# Patient Record
Sex: FEMALE | Race: WHITE | Employment: FULL TIME
[De-identification: names, ages, dates, MRNs, and addresses within clinical notes are randomized per-mention and may not be internally consistent; named-entity substitution may affect disease eponyms.]

---

## 2023-03-06 ENCOUNTER — OFFICE VISIT (OUTPATIENT)
Dept: PRIMARY CARE CLINIC | Facility: CLINIC | Age: 33
End: 2023-03-06
Payer: COMMERCIAL

## 2023-03-06 VITALS
RESPIRATION RATE: 16 BRPM | HEART RATE: 95 BPM | SYSTOLIC BLOOD PRESSURE: 116 MMHG | HEIGHT: 67 IN | WEIGHT: 132.4 LBS | TEMPERATURE: 97.8 F | BODY MASS INDEX: 20.78 KG/M2 | DIASTOLIC BLOOD PRESSURE: 67 MMHG | OXYGEN SATURATION: 98 %

## 2023-03-06 DIAGNOSIS — G93.9 RIGHT FRONTAL LOBE LESION: ICD-10-CM

## 2023-03-06 DIAGNOSIS — R20.2 NUMBNESS AND TINGLING OF RIGHT ARM AND LEG: Primary | ICD-10-CM

## 2023-03-06 DIAGNOSIS — R20.0 NUMBNESS AND TINGLING OF RIGHT ARM AND LEG: Primary | ICD-10-CM

## 2023-03-06 PROCEDURE — 99204 OFFICE O/P NEW MOD 45 MIN: CPT | Performed by: FAMILY MEDICINE

## 2023-03-06 RX ORDER — MULTIVIT-MIN/IRON FUM/FOLIC AC 7.5 MG-4
TABLET ORAL
Qty: 100 TABLET | Refills: 3 | Status: SHIPPED | OUTPATIENT
Start: 2023-03-06

## 2023-03-06 SDOH — ECONOMIC STABILITY: FOOD INSECURITY: WITHIN THE PAST 12 MONTHS, THE FOOD YOU BOUGHT JUST DIDN'T LAST AND YOU DIDN'T HAVE MONEY TO GET MORE.: NEVER TRUE

## 2023-03-06 SDOH — ECONOMIC STABILITY: FOOD INSECURITY: WITHIN THE PAST 12 MONTHS, YOU WORRIED THAT YOUR FOOD WOULD RUN OUT BEFORE YOU GOT MONEY TO BUY MORE.: NEVER TRUE

## 2023-03-06 SDOH — ECONOMIC STABILITY: HOUSING INSECURITY
IN THE LAST 12 MONTHS, WAS THERE A TIME WHEN YOU DID NOT HAVE A STEADY PLACE TO SLEEP OR SLEPT IN A SHELTER (INCLUDING NOW)?: NO

## 2023-03-06 SDOH — ECONOMIC STABILITY: INCOME INSECURITY: HOW HARD IS IT FOR YOU TO PAY FOR THE VERY BASICS LIKE FOOD, HOUSING, MEDICAL CARE, AND HEATING?: NOT HARD AT ALL

## 2023-03-06 ASSESSMENT — ENCOUNTER SYMPTOMS
CHEST TIGHTNESS: 0
NAUSEA: 0
SINUS PAIN: 0
PHOTOPHOBIA: 0
BACK PAIN: 0
ABDOMINAL DISTENTION: 0
APNEA: 0
EYE DISCHARGE: 0
CONSTIPATION: 0
SINUS PRESSURE: 0
SORE THROAT: 0
EYE REDNESS: 0
SHORTNESS OF BREATH: 0
ABDOMINAL PAIN: 0
BLOOD IN STOOL: 0
CHOKING: 0
EYE PAIN: 0
WHEEZING: 0
TROUBLE SWALLOWING: 0
VOICE CHANGE: 0
COUGH: 0
DIARRHEA: 0
VOMITING: 0
COLOR CHANGE: 0
RHINORRHEA: 0

## 2023-03-06 ASSESSMENT — PATIENT HEALTH QUESTIONNAIRE - PHQ9
SUM OF ALL RESPONSES TO PHQ QUESTIONS 1-9: 0
1. LITTLE INTEREST OR PLEASURE IN DOING THINGS: 0
SUM OF ALL RESPONSES TO PHQ9 QUESTIONS 1 & 2: 0
2. FEELING DOWN, DEPRESSED OR HOPELESS: 0

## 2023-03-06 NOTE — PROGRESS NOTES
Here for follow-up after hospital visit. Had numbness or tingling right side of body he still has some numbness right foot dorsum. Denies any back pain. She had a CT scan brain followed by MRI with and without contrast which has some nonspecific abnormalities cystic periventricular abnormalities not suggestive of actual cyst or tumor. Also lab test was normal pregnancy was negative. She is sexually active had not recent menstrual period. She denies smoking alcohol or drug abuse denies any recent weight changes. I have discussed at length the lab test and the CT scan MRI and went over the radiological report word by word and explained the possible significance however reassured that there is no obvious tumor that need to be removed and to cystic abnormalities on MRI does not appear to be significant by themselves. Due to his numbness she may benefit from neurology evaluation possible rule out multiple sclerosis or evaluation. She is otherwise general good health. Some of her symptoms possibly related to anxiety. Discussed management of anxiety hyperventilation possibility of starting SSRI if fluoxetine. She want to research on this before starting medications that seems appropriate. Review of Systems   Constitutional:  Negative for activity change, appetite change, chills, diaphoresis, fatigue, fever and unexpected weight change. HENT:  Negative for congestion, dental problem, drooling, ear pain, hearing loss, nosebleeds, rhinorrhea, sinus pressure, sinus pain, sore throat, trouble swallowing and voice change. Eyes:  Negative for photophobia, pain, discharge, redness and visual disturbance. Respiratory:  Negative for apnea, cough, choking, chest tightness, shortness of breath and wheezing. Cardiovascular:  Negative for chest pain, palpitations and leg swelling. Gastrointestinal:  Negative for abdominal distention, abdominal pain, blood in stool, constipation, diarrhea, nausea and vomiting.   Endocrine: Negative for polydipsia, polyphagia and polyuria.   Genitourinary:  Negative for decreased urine volume, difficulty urinating, dysuria, enuresis, flank pain, frequency, genital sores, hematuria, menstrual problem, pelvic pain, urgency, vaginal bleeding and vaginal discharge.   Musculoskeletal:  Negative for arthralgias, back pain, gait problem, joint swelling, myalgias and neck pain.   Skin:  Negative for color change, pallor, rash and wound.   Neurological:  Positive for numbness. Negative for dizziness, tremors, seizures, syncope, speech difficulty, weakness and headaches.   Hematological:  Negative for adenopathy. Does not bruise/bleed easily.   Psychiatric/Behavioral:  Negative for agitation, behavioral problems, confusion, decreased concentration, dysphoric mood, hallucinations, self-injury, sleep disturbance and suicidal ideas. The patient is nervous/anxious.       Physical Exam  Vitals and nursing note reviewed. Exam conducted with a chaperone present.   Constitutional:       General: She is not in acute distress.     Appearance: Normal appearance. She is normal weight. She is not ill-appearing or toxic-appearing.   HENT:      Head: Normocephalic and atraumatic.      Right Ear: External ear normal.      Left Ear: External ear normal.      Nose: Nose normal. No congestion or rhinorrhea.      Mouth/Throat:      Mouth: Mucous membranes are moist.      Pharynx: No oropharyngeal exudate.   Eyes:      General: No scleral icterus.        Right eye: No discharge.         Left eye: No discharge.      Extraocular Movements: Extraocular movements intact.      Conjunctiva/sclera: Conjunctivae normal.      Pupils: Pupils are equal, round, and reactive to light.   Cardiovascular:      Rate and Rhythm: Normal rate and regular rhythm.      Pulses: Normal pulses.      Heart sounds: Normal heart sounds. No murmur heard.    No gallop.   Pulmonary:      Effort: Pulmonary effort is normal. No respiratory distress.     Breath sounds: Normal breath sounds. No stridor. No wheezing, rhonchi or rales. Chest:      Chest wall: No tenderness. Abdominal:      General: Abdomen is flat. There is no distension. Palpations: Abdomen is soft. There is no mass. Tenderness: There is no abdominal tenderness. There is no right CVA tenderness, guarding or rebound. Hernia: No hernia is present. Genitourinary:     Vagina: No vaginal discharge. Musculoskeletal:         General: No swelling, tenderness, deformity or signs of injury. Normal range of motion. Cervical back: Normal range of motion and neck supple. No rigidity. Right lower leg: No edema. Left lower leg: No edema. Lymphadenopathy:      Cervical: No cervical adenopathy. Skin:     General: Skin is warm. Capillary Refill: Capillary refill takes less than 2 seconds. Coloration: Skin is not jaundiced or pale. Findings: No bruising, erythema, lesion or rash. Neurological:      General: No focal deficit present. Mental Status: She is alert and oriented to person, place, and time. Mental status is at baseline. Cranial Nerves: No cranial nerve deficit. Motor: No weakness. Coordination: Coordination normal.      Gait: Gait normal.   Psychiatric:         Attention and Perception: Attention and perception normal. She is attentive. She does not perceive auditory or visual hallucinations. Mood and Affect: Affect normal. Mood is anxious. Mood is not depressed or elated. Affect is not labile, blunt, flat, angry, tearful or inappropriate. Speech: Speech normal. She is communicative. Speech is not rapid and pressured, delayed, slurred or tangential.         Behavior: Behavior normal. Behavior is not agitated, slowed, aggressive, withdrawn, hyperactive or combative. Behavior is cooperative. Thought Content:  Thought content normal. Thought content is not paranoid or delusional. Thought content does not include homicidal or suicidal ideation. Thought content does not include homicidal or suicidal plan. Cognition and Memory: Cognition is not impaired. She does not exhibit impaired recent memory. Judgment: Judgment normal. Judgment is not impulsive or inappropriate. 1. Numbness and tingling of right arm and leg  Tingling numbness right side of body still have some numbness right foot. She has normal gait no obvious neurological deficit though still possibility of multiple sclerosis. Recent CT scan lab test MRI done in the emergency room reviewed in detail and discussed with patient along with them looking at the report does cystic abnormalities by itself are not significant do need further evaluation by neurology due to numbness. She also seem to have some anxiety. Several family members have severe anxiety and family. His mother  from multiple medications use for depression and other medical problems. Her sister is on medications for anxiety. I suggested possibility of starting sertraline uptake inhibitor such as fluoxetine Lexapro or Cymbalta as she want to wait on this and research it further. We will IV every 2 referred for behavioral health counseling.    - [address] [facility]    2. Right frontal lobe lesion    Neurology evaluation recommended and may benefit from further evaluation for possible multiple sclerosis  - [address] [place] [facility]  Preventative care discussed including multivitamin prenatal counseling she is sexually active unprotected sex again with her partner  . Increase fruits vegetables, fiber, whole grains, beans, exercise, tree nuts, will decrease risk of heart attacks and strokes, may reduce cancer risks     once a day multivitamin such as Centrum   store brand, due to benefit of folic acid vitamin D, has already mineral vitamin is recommended doses.   Multiple different vitamins not recommended may carry increased risk, including vitamin E, take foods rich in omega 3 and fibre, pills are not recommended, including omega 3 in high doses, may have increased risks   Enio Jorgensen MD

## 2023-03-27 ENCOUNTER — OFFICE VISIT (OUTPATIENT)
Dept: NEUROLOGY | Age: 33
End: 2023-03-27
Payer: COMMERCIAL

## 2023-03-27 VITALS
DIASTOLIC BLOOD PRESSURE: 68 MMHG | WEIGHT: 132 LBS | SYSTOLIC BLOOD PRESSURE: 110 MMHG | BODY MASS INDEX: 20.72 KG/M2 | HEIGHT: 67 IN

## 2023-03-27 DIAGNOSIS — R20.2 PARESTHESIA: Primary | ICD-10-CM

## 2023-03-27 DIAGNOSIS — G43.009 MIGRAINE WITHOUT AURA AND WITHOUT STATUS MIGRAINOSUS, NOT INTRACTABLE: ICD-10-CM

## 2023-03-27 DIAGNOSIS — R93.0 ABNORMAL MRI OF THE HEAD: ICD-10-CM

## 2023-03-27 PROCEDURE — 99205 OFFICE O/P NEW HI 60 MIN: CPT | Performed by: PSYCHIATRY & NEUROLOGY

## 2023-03-27 ASSESSMENT — ENCOUNTER SYMPTOMS
GASTROINTESTINAL NEGATIVE: 1
RESPIRATORY NEGATIVE: 1
EYES NEGATIVE: 1

## 2023-03-27 NOTE — PROGRESS NOTES
acknowledges the above examination and reviews. Current Meds Verified: Current meds/immunizations reviewed, including purpose with pt. Med Recon list given to pt/family. Pt advised to discard old med lists and provide all providers with current list at each visit and carry list with them in case of emergency. [ *NOTE:  parts or all of this consultation are produced using artificial voice recognition software.   Some speech errors are inherent in such software and may be included in the produced record. ]                Marcella Frye MD  Consultative Neurology, [address] [facility] [place] [address] OttJulia [address] [place] [address], [address] [place] [address]  Phone:  [phone]  Fax:   [phone]

## 2023-03-30 ENCOUNTER — OFFICE VISIT (OUTPATIENT)
Dept: NEUROLOGY | Age: 33
End: 2023-03-30
Payer: COMMERCIAL

## 2023-03-30 DIAGNOSIS — R41.82 ALTERED MENTAL STATUS, UNSPECIFIED ALTERED MENTAL STATUS TYPE: Primary | ICD-10-CM

## 2023-03-30 DIAGNOSIS — R93.0 ABNORMAL MRI OF THE HEAD: ICD-10-CM

## 2023-03-30 DIAGNOSIS — G43.009 MIGRAINE WITHOUT AURA AND WITHOUT STATUS MIGRAINOSUS, NOT INTRACTABLE: ICD-10-CM

## 2023-03-30 PROCEDURE — 95819 EEG AWAKE AND ASLEEP: CPT | Performed by: PSYCHIATRY & NEUROLOGY

## 2023-03-30 NOTE — PROGRESS NOTES
Jaqui Kaur MD  Consultative Neurology, Neurodiagnostics   [facility]    [address]   [address], [address] [place] [address]  Phone:  [phone]  Fax:   [phone]

## 2023-05-09 ENCOUNTER — PROCEDURE VISIT (OUTPATIENT)
Dept: NEUROLOGY | Age: 33
End: 2023-05-09
Payer: COMMERCIAL

## 2023-05-09 VITALS — WEIGHT: 135 LBS | BODY MASS INDEX: 21.14 KG/M2 | OXYGEN SATURATION: 96 % | HEART RATE: 74 BPM

## 2023-05-09 DIAGNOSIS — G93.9 RIGHT FRONTAL LOBE LESION: ICD-10-CM

## 2023-05-09 DIAGNOSIS — R93.0 ABNORMAL MRI OF THE HEAD: ICD-10-CM

## 2023-05-09 DIAGNOSIS — G43.009 MIGRAINE WITHOUT AURA AND WITHOUT STATUS MIGRAINOSUS, NOT INTRACTABLE: ICD-10-CM

## 2023-05-09 DIAGNOSIS — R20.2 NUMBNESS AND TINGLING IN BOTH HANDS: ICD-10-CM

## 2023-05-09 DIAGNOSIS — R20.0 NUMBNESS AND TINGLING IN BOTH HANDS: ICD-10-CM

## 2023-05-09 DIAGNOSIS — R20.2 NUMBNESS AND TINGLING OF BOTH FEET: ICD-10-CM

## 2023-05-09 DIAGNOSIS — Z87.39 HISTORY OF SPINAL FUSION FOR SCOLIOSIS: ICD-10-CM

## 2023-05-09 DIAGNOSIS — R20.2 PARESTHESIA: Primary | ICD-10-CM

## 2023-05-09 DIAGNOSIS — R20.0 NUMBNESS AND TINGLING OF BOTH FEET: ICD-10-CM

## 2023-05-09 DIAGNOSIS — Z98.890 H/O SPINAL SURGERY: ICD-10-CM

## 2023-05-09 DIAGNOSIS — G56.03 CARPAL TUNNEL SYNDROME, BILATERAL: ICD-10-CM

## 2023-05-09 DIAGNOSIS — Z79.899 ENCOUNTER FOR MEDICATION MANAGEMENT: ICD-10-CM

## 2023-05-09 DIAGNOSIS — Z98.1 HISTORY OF SPINAL FUSION FOR SCOLIOSIS: ICD-10-CM

## 2023-05-09 PROCEDURE — 95913 NRV CNDJ TEST 13/> STUDIES: CPT | Performed by: PSYCHIATRY & NEUROLOGY

## 2023-05-09 PROCEDURE — 99215 OFFICE O/P EST HI 40 MIN: CPT | Performed by: PSYCHIATRY & NEUROLOGY

## 2023-05-09 PROCEDURE — 95885 MUSC TST DONE W/NERV TST LIM: CPT | Performed by: PSYCHIATRY & NEUROLOGY

## 2023-05-09 ASSESSMENT — ENCOUNTER SYMPTOMS
BACK PAIN: 1
EYES NEGATIVE: 1

## 2023-05-09 ASSESSMENT — VISUAL ACUITY: OU: 1

## 2023-05-09 NOTE — PROGRESS NOTES
NEUROLOGY  RETURN  OFFICE VISIT [de-identified]                     5/9/2023  Delroy Nguyen is a 34 y.o. female here for [de-identified]  EMG RU RL == paresthesia      Referred by   Sanjiv Marin MD     Chief Complaint:   Paresthesia right side upper and lower. The specimen she lies on her right side during sleep. She is status post extensive vertebral surgery for scoliosis 20 years ago. She has not been reevaluated. 26-year-old woman accompanied by MIGUELINA who corroborates. She is here for EMG today. Previously as below. Last visit == 3/27/2023  Paresthesia  -     MRI BRAIN W WO CONTRAST; Future  Migraine without aura and without status migrainosus, not intractable  Abnormal MRI of the head  -     MRI BRAIN W WO CONTRAST; Future  1. Exam is very good,  2. Discussed many aspects of migraine apparently she had never been diagnosed with such although she has had chronic headaches. 3. She will keep a headache diary and consider medications such as protocol with end-stage, such as Excedrin migraine etc.  4. Identified migraine triggers. 5. Keep migraine diary. 6. Reevaluation in 6 months post previous MRI to compare. These appear to be static lesions by the report. I suspect that this will remain stable. She has no specifics on examination for anything focal.    In 2003 she had extensive spine surgery from cervical to lower spine for scoliosis. This was done at [facility]. He had an MRI scan CT scan at [facility] in March. She is lined up for repeat see below discussion. She is here because of right-sided paresthesias. Accompanied[de-identified]  David Colmenares who corroborates. Active Problems:    * No active hospital problems. *  Resolved Problems:    * No resolved hospital problems.  *    Past Medical History:   Diagnosis Date    Asthma     Scoliosis      Past Surgical History:   Procedure Laterality Date    CHOLECYSTECTOMY        Family History   Problem Relation Age of Onset    Gout Father     Lung Cancer Maternal Grandmother     Heart

## 2023-05-09 NOTE — PROGRESS NOTES
EMG/Nerve Conduction Study Procedure Note  [address]   [phone]      Hx:    Exam:     34 y.o. female right-handed returning for EMG right upper right lower artery paresthesia. No history of DM. History spinal fusion. Scoliosis surgery October 2003. Negative exam.  No spasticity. Referring: Dr. Laly Burnham  Technologist: [facility]  Height: 5 foot 7 inch          Summary               needle EMG of selected muscles as below with conduction velocity testing lower extremity and upper extremity on the right with some comparative conduction velocities. Controlled environmental factors / EMG lab. Temperature. NCV : sensory segments:    Normal sural SCV SNAP; slowed right median SCV with normal right ulnar right radial and bilateral sural SCV. Transcarpal SCV[de-identified]    Abnormal bilateral median transcarpal slowing with normal bilateral ulnar transcarpal; peak difference of left at latency 0.48 ms;   right at 0.42 ms  NCV plantar sensory segments:     Deferred. NCV Motor MCV segments:     Normal right median, left median right ulnar right peroneal and tibial MCV. F-wave studies:       Normal right median left median right ulnar right peroneal and tibial F waves. H-REFLEX Studies:       NEEDLE EMG:   Tested muscles[de-identified]    Right FDI EDC deltoid TA MG VL muscles all within normal limits =  Normal insertional activity and interference pattern/recruitment. No fasciculations fibrillations positive sharp waves. Normal MUP. No BSS AP. No giant MUP. No myotonia. No upper motor neuron sign. INTERPRETATION:    THESE FINDINGS ARE ELECTROPHYSIOLOGIC EVIDENCE ONLY SHOWING SOME EARLY CARPAL TUNNEL SYNDROMES BILATERALLY SENSORY ONLY. ENTRAPMENT. NO OTHER NEUROPATHY. NO MYOPATHY. NO FASCICULATION. NO MYOTONIA. CONCLUSION:      Compatible with very early bilateral carpal tunnel syndromes only.       Procedure Details:       Correlates with some of

## 2023-05-23 ENCOUNTER — OFFICE VISIT (OUTPATIENT)
Dept: ORTHOPEDIC SURGERY | Age: 33
End: 2023-05-23
Payer: COMMERCIAL

## 2023-05-23 VITALS — BODY MASS INDEX: 21.19 KG/M2 | HEIGHT: 67 IN | WEIGHT: 135 LBS

## 2023-05-23 DIAGNOSIS — M54.50 LOW BACK PAIN, UNSPECIFIED BACK PAIN LATERALITY, UNSPECIFIED CHRONICITY, UNSPECIFIED WHETHER SCIATICA PRESENT: Primary | ICD-10-CM

## 2023-05-23 DIAGNOSIS — M54.2 NECK PAIN: ICD-10-CM

## 2023-05-23 PROCEDURE — 99203 OFFICE O/P NEW LOW 30 MIN: CPT | Performed by: ORTHOPAEDIC SURGERY

## 2023-05-23 NOTE — PROGRESS NOTES
[facility]  Consultation Note    Patient ID:  Name: Luke Gómez  MRN: [id number]  AGE: 34 y.o.  : 1990    Date of Consultation:  May 23, 2023    CC:   Chief Complaint   Patient presents with    New Patient     Low back pain         HPI: This is a new patient visit on Luke Gómez, she is a 76-year-old white female that back in  underwent a T1-L1 scoliosis fusion with Lukey rods and wires by Dr. Janae Musa. I had seen her in follow-up . The reason she is coming in today is because she she has had some symptoms recently and been worked up by a neurologist and she says that she cannot has some decreased sensation on the whole right side of her body including her face and right ear. Were concerned and had some to do with her surgery. However, on further questioning most of the decree sensation she has is in the right leg and she can tell it when she is shaving her leg. He is not having any symptoms in the arm. Does complain of an area of significant tenderness just off to the right side in her upper thoracic spine area she denies any radiating arm or leg pain     Past Medical History Includes: Unremarkable except for her scoliosis surgery and apparently she has had a recent brain MRI scan that shows some cysts in her brain. Family History:   Family History   Problem Relation Age of Onset    Gout Father     Lung Cancer Maternal Grandmother     Heart Surgery Paternal Grandfather       Social History:   Social History     Tobacco Use    Smoking status: Never    Smokeless tobacco: Never   Substance Use Topics    Alcohol use: Yes     Comment: Once monthly       ALLERGIES:   Allergies   Allergen Reactions    Sulfa Antibiotics Other (See Comments)     Possible SOB.     Sulfamethoxazole      Other reaction(s): throat swollen        Patient Medications    Current Outpatient Medications   Medication Sig    LORATADINE PO Take by mouth    Multiple Vitamins-Minerals (MULTIVITAMIN

## 2023-07-05 ENCOUNTER — TELEMEDICINE (OUTPATIENT)
Dept: PRIMARY CARE CLINIC | Facility: CLINIC | Age: 33
End: 2023-07-05
Payer: COMMERCIAL

## 2023-07-05 DIAGNOSIS — R20.2 NUMBNESS AND TINGLING OF LEFT ARM AND LEG: ICD-10-CM

## 2023-07-05 DIAGNOSIS — R20.0 NUMBNESS AND TINGLING OF LEFT ARM AND LEG: ICD-10-CM

## 2023-07-05 DIAGNOSIS — R63.4 WEIGHT LOSS: ICD-10-CM

## 2023-07-05 DIAGNOSIS — Z01.419 PERIODIC HEALTH ASSESSMENT, PAP AND PELVIC: ICD-10-CM

## 2023-07-05 DIAGNOSIS — Z13.220 LIPID SCREENING: ICD-10-CM

## 2023-07-05 DIAGNOSIS — R10.13 EPIGASTRIC PAIN: ICD-10-CM

## 2023-07-05 DIAGNOSIS — K30 INDIGESTION: Primary | ICD-10-CM

## 2023-07-05 DIAGNOSIS — F43.9 STRESS: ICD-10-CM

## 2023-07-05 DIAGNOSIS — Z11.59 ENCOUNTER FOR HEPATITIS C SCREENING TEST FOR LOW RISK PATIENT: ICD-10-CM

## 2023-07-05 PROBLEM — J45.909 ASTHMA: Status: ACTIVE | Noted: 2023-07-05

## 2023-07-05 PROCEDURE — 99443 PR PHYS/QHP TELEPHONE EVALUATION 21-30 MIN: CPT | Performed by: FAMILY MEDICINE

## 2023-07-05 RX ORDER — PANTOPRAZOLE SODIUM 40 MG/1
40 TABLET, DELAYED RELEASE ORAL
Qty: 30 TABLET | Refills: 5 | Status: SHIPPED | OUTPATIENT
Start: 2023-07-05

## 2023-07-05 ASSESSMENT — ENCOUNTER SYMPTOMS
SHORTNESS OF BREATH: 0
EYE PAIN: 0
CHOKING: 0
TROUBLE SWALLOWING: 0
RHINORRHEA: 0
ABDOMINAL PAIN: 1
SINUS PRESSURE: 0
VOICE CHANGE: 0
VOMITING: 0
RECTAL PAIN: 0
DIARRHEA: 0
COUGH: 0
COLOR CHANGE: 0
CHEST TIGHTNESS: 0
NAUSEA: 0
EYE REDNESS: 0
BLOOD IN STOOL: 0
EYE DISCHARGE: 0
SORE THROAT: 0
PHOTOPHOBIA: 0
BACK PAIN: 0
CONSTIPATION: 0
ABDOMINAL DISTENTION: 0
SINUS PAIN: 0
WHEEZING: 0

## 2023-07-05 NOTE — PROGRESS NOTES
low risk patient    - Hepatitis C Antibody; Future    8. Periodic health assessment, Pap and pelvic    - [address], [address]  Multiple symptoms including chronic dyspepsia with the acute sharp pain mainly to solids in the epigastric region. No nausea vomiting diarrhea change in bowel habits. Previous history tingling numbness hands of foods extremities being evaluated by neurology MRI brain pending. Note of the symptoms possibly related to anxiety. We will start Protonix abdominal ultrasound lab test if these are normal consider starting fluoxetine  Anticipatory guidance preventative care discussed OB/GYN follow-up for contraceptive management. She has no children and would like to have a child however currently not using any contraception not trying to have a baby normal menses    No smoking alcohol or drug abuse no other surgeries other than cholecystectomy. She may benefit from EGD further evaluation gastroenterology consultation    Consent:  She and/or her healthcare decision maker is aware that this patient-initiated Telehealth encounter is a billable service, with coverage as determined by her insurance carrier. She is aware that she may receive a bill and has provided verbal consent to proceed: Yes    This virtual visit was conducted via telephone only. Was unable to connect with video. Patient is in [address] [place] [address]. I am in office pursuant to the emergency declaration under the [place] [address] waiver authority and the Coronavirus Preparedness and [employer] Act, this Virtual  Visit was conducted to reduce the patient's risk of exposure to COVID-19 and provide continuity of care for an established patient. Services were provided through a video synchronous discussion virtually to substitute for in-person clinic visit.   Due to this being a TeleHealth evaluation, many elements of the physical examination are

## 2023-07-10 DIAGNOSIS — R10.13 EPIGASTRIC PAIN: ICD-10-CM

## 2023-07-10 DIAGNOSIS — K30 INDIGESTION: ICD-10-CM

## 2023-07-10 DIAGNOSIS — R63.4 WEIGHT LOSS: ICD-10-CM

## 2023-07-10 DIAGNOSIS — Z13.220 LIPID SCREENING: ICD-10-CM

## 2023-07-10 DIAGNOSIS — Z11.59 ENCOUNTER FOR HEPATITIS C SCREENING TEST FOR LOW RISK PATIENT: ICD-10-CM

## 2023-07-10 LAB
ALBUMIN SERPL-MCNC: 3.9 G/DL (ref 3.5–5)
ALBUMIN/GLOB SERPL: 1.2 (ref 0.4–1.6)
ALP SERPL-CCNC: 66 U/L (ref 50–136)
ALT SERPL-CCNC: 17 U/L (ref 12–65)
ANION GAP SERPL CALC-SCNC: 5 MMOL/L (ref 2–11)
AST SERPL-CCNC: 17 U/L (ref 15–37)
BASOPHILS # BLD: 0.1 K/UL (ref 0–0.2)
BASOPHILS NFR BLD: 1 % (ref 0–2)
BILIRUB SERPL-MCNC: 0.4 MG/DL (ref 0.2–1.1)
BUN SERPL-MCNC: 6 MG/DL (ref 6–23)
CALCIUM SERPL-MCNC: 9.2 MG/DL (ref 8.3–10.4)
CHLORIDE SERPL-SCNC: 107 MMOL/L (ref 101–110)
CHOLEST SERPL-MCNC: 154 MG/DL
CO2 SERPL-SCNC: 28 MMOL/L (ref 21–32)
CREAT SERPL-MCNC: 0.8 MG/DL (ref 0.6–1)
DIFFERENTIAL METHOD BLD: NORMAL
EOSINOPHIL # BLD: 0.1 K/UL (ref 0–0.8)
EOSINOPHIL NFR BLD: 1 % (ref 0.5–7.8)
ERYTHROCYTE [DISTWIDTH] IN BLOOD BY AUTOMATED COUNT: 12.8 % (ref 11.9–14.6)
GLOBULIN SER CALC-MCNC: 3.3 G/DL (ref 2.8–4.5)
GLUCOSE SERPL-MCNC: 114 MG/DL (ref 65–100)
HCT VFR BLD AUTO: 43.5 % (ref 35.8–46.3)
HDLC SERPL-MCNC: 69 MG/DL (ref 40–60)
HDLC SERPL: 2.2
HGB BLD-MCNC: 14.6 G/DL (ref 11.7–15.4)
IMM GRANULOCYTES # BLD AUTO: 0 K/UL (ref 0–0.5)
IMM GRANULOCYTES NFR BLD AUTO: 0 % (ref 0–5)
LDLC SERPL CALC-MCNC: 76.8 MG/DL
LYMPHOCYTES # BLD: 1.8 K/UL (ref 0.5–4.6)
LYMPHOCYTES NFR BLD: 35 % (ref 13–44)
MCH RBC QN AUTO: 30.8 PG (ref 26.1–32.9)
MCHC RBC AUTO-ENTMCNC: 33.6 G/DL (ref 31.4–35)
MCV RBC AUTO: 91.8 FL (ref 82–102)
MONOCYTES # BLD: 0.3 K/UL (ref 0.1–1.3)
MONOCYTES NFR BLD: 5 % (ref 4–12)
NEUTS SEG # BLD: 3 K/UL (ref 1.7–8.2)
NEUTS SEG NFR BLD: 58 % (ref 43–78)
NRBC # BLD: 0 K/UL (ref 0–0.2)
PLATELET # BLD AUTO: 225 K/UL (ref 150–450)
PMV BLD AUTO: 11.3 FL (ref 9.4–12.3)
POTASSIUM SERPL-SCNC: 3.6 MMOL/L (ref 3.5–5.1)
PROT SERPL-MCNC: 7.2 G/DL (ref 6.3–8.2)
RBC # BLD AUTO: 4.74 M/UL (ref 4.05–5.2)
SODIUM SERPL-SCNC: 140 MMOL/L (ref 133–143)
TRIGL SERPL-MCNC: 41 MG/DL (ref 35–150)
TSH, 3RD GENERATION: 1.56 UIU/ML (ref 0.36–3.74)
VLDLC SERPL CALC-MCNC: 8.2 MG/DL (ref 6–23)
WBC # BLD AUTO: 5.3 K/UL (ref 4.3–11.1)

## 2023-07-11 ENCOUNTER — HOSPITAL ENCOUNTER (OUTPATIENT)
Dept: ULTRASOUND IMAGING | Age: 33
Discharge: HOME | End: 2023-07-14
Attending: FAMILY MEDICINE

## 2023-07-11 ENCOUNTER — HOSPITAL ENCOUNTER (OUTPATIENT)
Dept: MRI IMAGING | Age: 33
Discharge: HOME | End: 2023-07-14
Attending: PSYCHIATRY & NEUROLOGY

## 2023-07-11 DIAGNOSIS — K30 INDIGESTION: ICD-10-CM

## 2023-07-11 DIAGNOSIS — R93.0 ABNORMAL MRI OF THE HEAD: ICD-10-CM

## 2023-07-11 DIAGNOSIS — R10.13 EPIGASTRIC PAIN: ICD-10-CM

## 2023-07-11 DIAGNOSIS — R20.2 PARESTHESIA: ICD-10-CM

## 2023-07-11 LAB
HCV AB SER QL: NONREACTIVE
HIV 1+2 AB+HIV1 P24 AG SERPL QL IA: NONREACTIVE
HIV 1/2 RESULT COMMENT: NORMAL

## 2023-08-02 LAB
H PYLORI IGA SER-ACNC: 9.1 UNITS (ref 0–8.9)
H PYLORI IGG SER IA-ACNC: 0.5 INDEX VALUE (ref 0–0.79)
H PYLORI IGM SER-ACNC: <9 UNITS (ref 0–8.9)

## 2023-08-09 ENCOUNTER — OFFICE VISIT (OUTPATIENT)
Dept: OBGYN CLINIC | Age: 33
End: 2023-08-09
Payer: COMMERCIAL

## 2023-08-09 VITALS
DIASTOLIC BLOOD PRESSURE: 68 MMHG | BODY MASS INDEX: 20.72 KG/M2 | WEIGHT: 132 LBS | SYSTOLIC BLOOD PRESSURE: 112 MMHG | HEIGHT: 67 IN

## 2023-08-09 DIAGNOSIS — Z12.4 ENCOUNTER FOR PAPANICOLAOU SMEAR FOR CERVICAL CANCER SCREENING: ICD-10-CM

## 2023-08-09 DIAGNOSIS — Z01.419 ENCNTR FOR GYN EXAM (GENERAL) (ROUTINE) W/O ABN FINDINGS: Primary | ICD-10-CM

## 2023-08-09 DIAGNOSIS — N94.6 DYSMENORRHEA: ICD-10-CM

## 2023-08-09 PROCEDURE — 99385 PREV VISIT NEW AGE 18-39: CPT | Performed by: OBSTETRICS & GYNECOLOGY

## 2023-08-09 ASSESSMENT — PATIENT HEALTH QUESTIONNAIRE - PHQ9: DEPRESSION UNABLE TO ASSESS: URGENT/EMERGENT SITUATION

## 2023-08-09 NOTE — PROGRESS NOTES
Pt comes in today for AE. LAST PAP:  more than 5 years    LAST MAMMO:  never    LMP:  Patient's last menstrual period was 07/25/2023 (approximate).     BIRTH CONTROL:  none    TOBACCO USE:  No    FAMILY HISTORY OF:   Breast Cancer:  No   Ovarian Cancer:  No   Uterine Cancer:  No   Colon Cancer:  No    Vitals:    08/09/23 1558   BP: 112/68   Site: Left Upper Arm   Position: Sitting   Weight: 132 lb (59.9 kg)   Height: 5' 7\" (1.702 m)        Lissett Thrasher MA  08/09/23  4:31 PM
Laterality Date    CHOLECYSTECTOMY  2016           Family History   Problem Relation Age of Onset    Heart Surgery Paternal Grandfather     Lung Cancer Maternal Grandmother     Asthma Maternal Grandmother     Gout Father     Pulmonary Embolism Mother     Endometriosis Mother     Breast Cancer Neg Hx     Ovarian Cancer Neg Hx     Uterine Cancer Neg Hx     Colon Cancer Neg Hx            Social History     Socioeconomic History    Marital status: Single     Spouse name: Not on file    Number of children: Not on file    Years of education: Not on file    Highest education level: Not on file   Occupational History    Not on file   Tobacco Use    Smoking status: Never    Smokeless tobacco: Never    Tobacco comments:     Tried it years ago. Not for me. Substance and Sexual Activity    Alcohol use: Yes     Alcohol/week: 1.0 standard drink     Types: 1 Cans of beer per week     Comment: Not often. Maybe a beer or 2 a month. Drug use: Never    Sexual activity: Not Currently   Other Topics Concern    Not on file   Social History Narrative    Not on file     Social Determinants of Health     Financial Resource Strain: Low Risk     Difficulty of Paying Living Expenses: Not hard at all   Food Insecurity: No Food Insecurity    Worried About [place] in the Last Year: Never true    [address] in the Last Year: Never true   Transportation Needs: Unknown    Lack of Transportation (Medical): Not on file    Lack of Transportation (Non-Medical): No   Physical Activity: Not on file   Stress: Not on file   Social Connections: Not on file   Intimate Partner Violence: Not on file   Housing Stability: Unknown    Unable to Pay for Housing in the Last Year: Not on file    Number of Places Lived in the Last Year: Not on file    Unstable Housing in the Last Year: No           Allergies   Allergen Reactions    Sulfa Antibiotics Other (See Comments)     Possible SOB.     Sulfamethoxazole      Other reaction(s): throat swollen

## 2023-08-15 ENCOUNTER — TELEPHONE (OUTPATIENT)
Dept: OBGYN CLINIC | Age: 33
End: 2023-08-15

## 2023-08-15 LAB
CYTOLOGIST CVX/VAG CYTO: NORMAL
CYTOLOGY CVX/VAG DOC THIN PREP: NORMAL
HPV APTIMA: NEGATIVE
HPV GENOTYPE REFLEX: NORMAL
Lab: NORMAL
PATH REPORT.FINAL DX SPEC: NORMAL
STAT OF ADQ CVX/VAG CYTO-IMP: NORMAL

## 2023-12-27 ENCOUNTER — TELEPHONE (OUTPATIENT)
Dept: NEUROLOGY | Age: 33
End: 2023-12-27

## 2023-12-27 DIAGNOSIS — R90.89 ABNORMAL FINDING ON MRI OF BRAIN: Primary | ICD-10-CM

## 2024-01-08 ENCOUNTER — TELEPHONE (OUTPATIENT)
Dept: NEUROLOGY | Age: 34
End: 2024-01-08

## 2024-01-10 ENCOUNTER — OFFICE VISIT (OUTPATIENT)
Dept: NEUROLOGY | Age: 34
End: 2024-01-10
Payer: COMMERCIAL

## 2024-01-10 ENCOUNTER — HOSPITAL ENCOUNTER (OUTPATIENT)
Dept: MRI IMAGING | Age: 34
Discharge: HOME | End: 2024-01-13
Attending: PSYCHIATRY & NEUROLOGY

## 2024-01-10 VITALS
WEIGHT: 132.4 LBS | BODY MASS INDEX: 20.78 KG/M2 | SYSTOLIC BLOOD PRESSURE: 110 MMHG | DIASTOLIC BLOOD PRESSURE: 77 MMHG | HEART RATE: 82 BPM | HEIGHT: 67 IN | OXYGEN SATURATION: 98 %

## 2024-01-10 DIAGNOSIS — R90.89 ABNORMAL FINDING ON MRI OF BRAIN: ICD-10-CM

## 2024-01-10 DIAGNOSIS — Z87.448 HX OF SIMPLE RENAL CYST: ICD-10-CM

## 2024-01-10 DIAGNOSIS — G43.009 MIGRAINE WITHOUT AURA AND WITHOUT STATUS MIGRAINOSUS, NOT INTRACTABLE: ICD-10-CM

## 2024-01-10 DIAGNOSIS — Z79.899 ENCOUNTER FOR MEDICATION MANAGEMENT: ICD-10-CM

## 2024-01-10 DIAGNOSIS — R93.0 ABNORMAL MRI OF THE HEAD: Primary | ICD-10-CM

## 2024-01-10 PROCEDURE — 99215 OFFICE O/P EST HI 40 MIN: CPT | Performed by: PSYCHIATRY & NEUROLOGY

## 2024-01-10 ASSESSMENT — ENCOUNTER SYMPTOMS: EYES NEGATIVE: 1

## 2024-01-10 ASSESSMENT — PATIENT HEALTH QUESTIONNAIRE - PHQ9
2. FEELING DOWN, DEPRESSED OR HOPELESS: 0
SUM OF ALL RESPONSES TO PHQ QUESTIONS 1-9: 0
1. LITTLE INTEREST OR PLEASURE IN DOING THINGS: 0
SUM OF ALL RESPONSES TO PHQ QUESTIONS 1-9: 0
SUM OF ALL RESPONSES TO PHQ9 QUESTIONS 1 & 2: 0

## 2024-01-10 ASSESSMENT — VISUAL ACUITY: OU: 1

## 2024-01-10 NOTE — PROGRESS NOTES
NEUROLOGY  RETURN  OFFICE VISIT ::                     1/10/2024  Sathish Rey is a 33 y.o. female here for ::    for mixed problems of Migraine and a hx of right sided sensory spell to ER and stroke workup at [facility] - revealed some cortical right frontal cysts that now on repeat MRI are stable unchanged (2x MRI and a CT head scan from 3/1/2023 and repeated.       Referred by   Dr Tom Greer -->>      Chief Complaint:   Chief Complaint   Patient presents with    Follow-up     Altered mental status, unspecified altered mental status type   Accompanied:    - corroborates.         34 yo MRHWF  Migraine are mild mixed.   Not sure the right sided spell was a complex migraine.           Active Problems:    * No active hospital problems. *  Resolved Problems:    * No resolved hospital problems. *    Past Medical History:   Diagnosis Date    Asthma     Headache     Low back pain     Neck pain     Scoliosis      Past Surgical History:   Procedure Laterality Date    CHOLECYSTECTOMY  2016      Family History   Problem Relation Age of Onset    Heart Surgery Paternal Grandfather     Lung Cancer Maternal Grandmother     Asthma Maternal Grandmother     Gout Father     Pulmonary Embolism Mother     Endometriosis Mother     Breast Cancer Neg Hx     Ovarian Cancer Neg Hx     Uterine Cancer Neg Hx     Colon Cancer Neg Hx      Social History     Socioeconomic History    Marital status: Single     Spouse name: None    Number of children: None    Years of education: None    Highest education level: None   Tobacco Use    Smoking status: Never    Smokeless tobacco: Never    Tobacco comments:     Tried it years ago. Not for me.   Substance and Sexual Activity    Alcohol use: Yes     Alcohol/week: 1.0 standard drink of alcohol     Types: 1 Cans of beer per week     Comment: Not often. Maybe a beer or 2 a month.    Drug use: Never    Sexual activity: Not Currently     Social Determinants of Health     Financial Resource Strain:

## 2024-02-13 ENCOUNTER — OFFICE VISIT (OUTPATIENT)
Dept: INTERNAL MEDICINE CLINIC | Facility: CLINIC | Age: 34
End: 2024-02-13
Payer: COMMERCIAL

## 2024-02-13 VITALS
WEIGHT: 135.4 LBS | OXYGEN SATURATION: 97 % | BODY MASS INDEX: 21.25 KG/M2 | SYSTOLIC BLOOD PRESSURE: 98 MMHG | HEIGHT: 67 IN | DIASTOLIC BLOOD PRESSURE: 72 MMHG | HEART RATE: 85 BPM

## 2024-02-13 DIAGNOSIS — N28.1 RENAL CYST: ICD-10-CM

## 2024-02-13 DIAGNOSIS — R10.13 EPIGASTRIC PAIN: ICD-10-CM

## 2024-02-13 DIAGNOSIS — Z76.89 ENCOUNTER TO ESTABLISH CARE: Primary | ICD-10-CM

## 2024-02-13 PROCEDURE — 99205 OFFICE O/P NEW HI 60 MIN: CPT | Performed by: NURSE PRACTITIONER

## 2024-02-13 SDOH — ECONOMIC STABILITY: FOOD INSECURITY: WITHIN THE PAST 12 MONTHS, THE FOOD YOU BOUGHT JUST DIDN'T LAST AND YOU DIDN'T HAVE MONEY TO GET MORE.: NEVER TRUE

## 2024-02-13 SDOH — HEALTH STABILITY: PHYSICAL HEALTH: ON AVERAGE, HOW MANY DAYS PER WEEK DO YOU ENGAGE IN MODERATE TO STRENUOUS EXERCISE (LIKE A BRISK WALK)?: 0 DAYS

## 2024-02-13 SDOH — ECONOMIC STABILITY: FOOD INSECURITY: WITHIN THE PAST 12 MONTHS, YOU WORRIED THAT YOUR FOOD WOULD RUN OUT BEFORE YOU GOT MONEY TO BUY MORE.: NEVER TRUE

## 2024-02-13 SDOH — ECONOMIC STABILITY: INCOME INSECURITY: HOW HARD IS IT FOR YOU TO PAY FOR THE VERY BASICS LIKE FOOD, HOUSING, MEDICAL CARE, AND HEATING?: NOT HARD AT ALL

## 2024-02-13 ASSESSMENT — PATIENT HEALTH QUESTIONNAIRE - PHQ9
SUM OF ALL RESPONSES TO PHQ QUESTIONS 1-9: 11
2. FEELING DOWN, DEPRESSED OR HOPELESS: 2
6. FEELING BAD ABOUT YOURSELF - OR THAT YOU ARE A FAILURE OR HAVE LET YOURSELF OR YOUR FAMILY DOWN: 3
SUM OF ALL RESPONSES TO PHQ QUESTIONS 1-9: 11
SUM OF ALL RESPONSES TO PHQ QUESTIONS 1-9: 11
1. LITTLE INTEREST OR PLEASURE IN DOING THINGS: 2
4. FEELING TIRED OR HAVING LITTLE ENERGY: 1
5. POOR APPETITE OR OVEREATING: 1
3. TROUBLE FALLING OR STAYING ASLEEP: 0
8. MOVING OR SPEAKING SO SLOWLY THAT OTHER PEOPLE COULD HAVE NOTICED. OR THE OPPOSITE, BEING SO FIGETY OR RESTLESS THAT YOU HAVE BEEN MOVING AROUND A LOT MORE THAN USUAL: 1
7. TROUBLE CONCENTRATING ON THINGS, SUCH AS READING THE NEWSPAPER OR WATCHING TELEVISION: 1
SUM OF ALL RESPONSES TO PHQ9 QUESTIONS 1 & 2: 4
9. THOUGHTS THAT YOU WOULD BE BETTER OFF DEAD, OR OF HURTING YOURSELF: 0
SUM OF ALL RESPONSES TO PHQ QUESTIONS 1-9: 11
10. IF YOU CHECKED OFF ANY PROBLEMS, HOW DIFFICULT HAVE THESE PROBLEMS MADE IT FOR YOU TO DO YOUR WORK, TAKE CARE OF THINGS AT HOME, OR GET ALONG WITH OTHER PEOPLE: 2

## 2024-02-13 NOTE — PROGRESS NOTES
COMPREHENSIVE EVALUATION, NEW PATIENT    SUBJECTIVE:   Sathish Rey is a 33 y.o. female is a new patient to our practice.  she is being seen today for a comprehensive evaluation of existing and new medical problems.     Chief Complaint   Patient presents with    New Patient    Establish Care       Previous health care providers include:  Dr. Greer    HPI  Epigastric pain.  \"I burp all the time.\"   Eating made it worse initially.  Does not recall RUQ pain after eating.    Chest pain constant for a certain amount of time.  Not with activity.  Can't describe improvement or worsening with center sternum.      Dr. Greer did US re epigastric pain and found renal cyst.    Last year stood at work, right leg numbness tingling in the right leg.  Went to hospital.  CT head then MRI of head due to cysts  \"Cystic lesions within the right frontal periventricular white matter consistent with dilated perivascular spaces .\" -- per [facility] report.    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     Headaches -- sound and light affect me sometimes.  Daily headaches.  Does not use any treatment methods.      Allergies wakes feeling congested.  Chronic PND.  Spring is worse.  I cough and get   Has taken some meds  has used a power.  Has one cat.  Sensitive to smells, dust.    Real bad menstrual cramps.  Sometimes they are bad.  Ibuprofen 200 mg only.  After 30 minutes feels better.  Periods are regular.  Volume not too heavy.      Previous blood donor several times but did have to stop.    Life stress in last few years.      Current Outpatient Medications   Medication Sig Dispense Refill    VITAMIN D PO Take by mouth      FOLIC ACID PO Take by mouth       No current facility-administered medications for this visit.     Patient Active Problem List   Diagnosis    Right frontal lobe lesion    Migraine without aura and without status migrainosus, not intractable    Paresthesia    Abnormal MRI of the head    Encounter for medication

## 2024-03-06 ASSESSMENT — ENCOUNTER SYMPTOMS
DIARRHEA: 0
CONSTIPATION: 1
ABDOMINAL PAIN: 0
NAUSEA: 0
VOMITING: 0
BACK PAIN: 1
BLOOD IN STOOL: 0

## 2024-03-22 ENCOUNTER — HOSPITAL ENCOUNTER (OUTPATIENT)
Dept: GENERAL RADIOLOGY | Age: 34
Discharge: HOME | End: 2024-03-22
Payer: COMMERCIAL

## 2024-03-22 ENCOUNTER — HOSPITAL ENCOUNTER (OUTPATIENT)
Dept: ULTRASOUND IMAGING | Age: 34
End: 2024-03-22
Payer: COMMERCIAL

## 2024-03-22 DIAGNOSIS — R10.13 EPIGASTRIC PAIN: ICD-10-CM

## 2024-03-22 DIAGNOSIS — N28.1 RENAL CYST: ICD-10-CM

## 2024-03-22 PROCEDURE — 6370000000 HC RX 637 (ALT 250 FOR IP): Performed by: NURSE PRACTITIONER

## 2024-03-22 PROCEDURE — 74240 X-RAY XM UPR GI TRC 1CNTRST: CPT

## 2024-03-22 PROCEDURE — 2500000003 HC RX 250 WO HCPCS: Performed by: NURSE PRACTITIONER

## 2024-03-22 RX ADMIN — BARIUM SULFATE 140 ML: 980 POWDER, FOR SUSPENSION ORAL at 08:39

## 2024-03-22 RX ADMIN — ANTACID/ANTIFLATULENT 1 EACH: 380; 550; 10; 10 GRANULE, EFFERVESCENT ORAL at 08:38

## 2024-03-22 RX ADMIN — BARIUM SULFATE 355 ML: 0.6 SUSPENSION ORAL at 08:42

## 2024-03-22 RX ADMIN — BARIUM SULFATE 1 TABLET: 700 TABLET ORAL at 08:46
